# Patient Record
Sex: MALE | Race: WHITE | NOT HISPANIC OR LATINO | Employment: OTHER | URBAN - METROPOLITAN AREA
[De-identification: names, ages, dates, MRNs, and addresses within clinical notes are randomized per-mention and may not be internally consistent; named-entity substitution may affect disease eponyms.]

---

## 2017-09-27 ENCOUNTER — HOSPITAL ENCOUNTER (OUTPATIENT)
Dept: RADIOLOGY | Facility: MEDICAL CENTER | Age: 62
End: 2017-09-27
Attending: INTERNAL MEDICINE
Payer: MEDICARE

## 2017-09-27 ENCOUNTER — HOSPITAL ENCOUNTER (OUTPATIENT)
Dept: RADIOLOGY | Facility: MEDICAL CENTER | Age: 62
End: 2017-09-27

## 2017-09-27 DIAGNOSIS — C85.10 B-CELL LYMPHOMA, UNSPECIFIED B-CELL LYMPHOMA TYPE, UNSPECIFIED BODY REGION (HCC): ICD-10-CM

## 2017-09-27 DIAGNOSIS — M15.0 PRIMARY GENERALIZED (OSTEO)ARTHRITIS: ICD-10-CM

## 2017-09-27 DIAGNOSIS — M15.0 PRIMARY GENERALIZED HYPERTROPHIC OSTEOARTHROSIS: ICD-10-CM

## 2017-09-27 PROCEDURE — 73080 X-RAY EXAM OF ELBOW: CPT | Mod: RT

## 2017-09-27 PROCEDURE — 700117 HCHG RX CONTRAST REV CODE 255: Performed by: INTERNAL MEDICINE

## 2017-09-27 PROCEDURE — A9579 GAD-BASE MR CONTRAST NOS,1ML: HCPCS | Performed by: INTERNAL MEDICINE

## 2017-09-27 PROCEDURE — 74183 MRI ABD W/O CNTR FLWD CNTR: CPT

## 2017-09-27 PROCEDURE — 73030 X-RAY EXAM OF SHOULDER: CPT | Mod: RT

## 2017-09-27 RX ADMIN — GADODIAMIDE 20 ML: 287 INJECTION INTRAVENOUS at 16:25

## 2018-05-28 ENCOUNTER — PATIENT OUTREACH (OUTPATIENT)
Dept: HEALTH INFORMATION MANAGEMENT | Facility: OTHER | Age: 63
End: 2018-05-28

## 2018-05-28 NOTE — PROGRESS NOTES
Attempt #:Final  HealthConnect Verified: yes  Verify PCP: yes/ Dr. Cheo Interiano // Non [facility] PCP    Pt will discuss care gaps with PCP.     Care Gap Scheduling (Attempt to Schedule EACH Overdue Care Gap!)  Health Maintenance Due   Topic Date Due   • Annual Wellness Visit  1955   • IMM DTaP/Tdap/Td Vaccine (1 - Tdap) 08/14/1973   • IMM PNEUMOCOCCAL 19-64 (ADULT) HIGHEST RISK SERIES (1 of 3 - PCV13) 08/14/1973   • COLONOSCOPY  08/14/2004

## 2018-10-16 ENCOUNTER — PATIENT OUTREACH (OUTPATIENT)
Dept: HEALTH INFORMATION MANAGEMENT | Facility: OTHER | Age: 63
End: 2018-10-16

## 2018-10-16 NOTE — PROGRESS NOTES
1. Attempt #:1    2. HealthConnect Verified: yes    3. Verify PCP: yes    4. Review Care Team: no    5. WebIZ Checked & Epic Updated: Yes  · WebIZ Recommendations: TD and SHINGRIX (Shingles)  · Is patient due for Tdap? YES. Patient was not notified of copay/out of pocket cost.  · Is patient due for Shingles? YES. Patient was not notified of copay/out of pocket cost.    6. Reviewed/Updated the following with patient:       •   Communication Preference Obtained? YES       •   Preferred Pharmacy? NO       •   Preferred Lab? NO       •   Family History (document living status of immediate family members and if + hx of cancer, diabetes, hypertension, hyperlipidemia, heart attack, stroke) NO    7. Annual Wellness Visit Scheduling  · Scheduling Status:Not Scheduled. Patient states they are not interested       9. Care Gap Scheduling (Attempt to Schedule EACH Overdue Care Gap!)     Health Maintenance Due   Topic Date Due   • Annual Wellness Visit  1955   • IMM DTaP/Tdap/Td Vaccine (1 - Tdap) 08/14/1973   • COLONOSCOPY  08/14/2004   • IMM ZOSTER VACCINES (1 of 2) 08/14/2004   • IMM INFLUENZA (1) 09/01/2018        Patient declined Immunizations: TDAP and SHINGRIX (Shingles).     10. Midokura Activation: declined    11. Midokura Kar: no    12. Virtual Visits: no    13. Opt In to Text Messages: no    14. Patient was NOT advised: “This is a free wellness visit. The provider will screen for medical conditions to help you stay healthy. If you have other concerns to address you may be asked to discuss these at a separate visit or there may be an additional fee.”     15. Patient was NOT informed to arrive 15 min prior to their scheduled appointment and bring in their medication bottles.

## 2018-12-06 ENCOUNTER — PATIENT OUTREACH (OUTPATIENT)
Dept: HEALTH INFORMATION MANAGEMENT | Facility: OTHER | Age: 63
End: 2018-12-06

## 2018-12-06 NOTE — PROGRESS NOTES
Outcome: Left Message    Please transfer to Patient Outreach Team at [phone] when patient returns call.    WebIZ Checked & Epic Updated:  yes    HealthConnect Verified: yes    Attempt # 1

## 2018-12-20 NOTE — PROGRESS NOTES
Outcome: Left Message    Please transfer to Patient Outreach Team at [phone] when patient returns call.    Attempt # 2    QIP

## 2019-05-22 ENCOUNTER — APPOINTMENT (OUTPATIENT)
Dept: RADIOLOGY | Facility: MEDICAL CENTER | Age: 64
End: 2019-05-22
Attending: NURSE PRACTITIONER
Payer: MEDICARE

## 2019-05-22 DIAGNOSIS — R22.0 INTRACRANIAL SWELLING: ICD-10-CM

## 2019-05-22 DIAGNOSIS — M54.2 CERVICALGIA: ICD-10-CM

## 2019-05-22 PROCEDURE — 72141 MRI NECK SPINE W/O DYE: CPT

## 2019-05-22 PROCEDURE — A9585 GADOBUTROL INJECTION: HCPCS | Performed by: NURSE PRACTITIONER

## 2019-05-22 PROCEDURE — 70553 MRI BRAIN STEM W/O & W/DYE: CPT

## 2019-05-22 PROCEDURE — 700117 HCHG RX CONTRAST REV CODE 255: Performed by: NURSE PRACTITIONER

## 2019-05-22 RX ORDER — GADOBUTROL 604.72 MG/ML
10 INJECTION INTRAVENOUS ONCE
Status: COMPLETED | OUTPATIENT
Start: 2019-05-22 | End: 2019-05-22

## 2019-05-22 RX ADMIN — GADOBUTROL 10 ML: 604.72 INJECTION INTRAVENOUS at 13:50

## 2021-03-03 DIAGNOSIS — Z23 NEED FOR VACCINATION: ICD-10-CM

## 2021-06-22 ENCOUNTER — PATIENT OUTREACH (OUTPATIENT)
Dept: HEALTH INFORMATION MANAGEMENT | Facility: OTHER | Age: 66
End: 2021-06-22

## 2022-06-30 ENCOUNTER — TELEPHONE (OUTPATIENT)
Dept: HEALTH INFORMATION MANAGEMENT | Facility: OTHER | Age: 67
End: 2022-06-30
Payer: MEDICARE

## 2022-07-06 ENCOUNTER — PATIENT MESSAGE (OUTPATIENT)
Dept: HEALTH INFORMATION MANAGEMENT | Facility: OTHER | Age: 67
End: 2022-07-06

## 2022-08-01 ENCOUNTER — TELEPHONE (OUTPATIENT)
Dept: HEALTH INFORMATION MANAGEMENT | Facility: OTHER | Age: 67
End: 2022-08-01

## 2022-10-17 ENCOUNTER — DOCUMENTATION (OUTPATIENT)
Dept: HEALTH INFORMATION MANAGEMENT | Facility: OTHER | Age: 67
End: 2022-10-17
Payer: MEDICARE

## 2022-12-13 ENCOUNTER — OFFICE VISIT (OUTPATIENT)
Dept: URGENT CARE | Facility: CLINIC | Age: 67
End: 2022-12-13
Payer: MEDICARE

## 2022-12-13 VITALS
HEIGHT: 73 IN | SYSTOLIC BLOOD PRESSURE: 120 MMHG | WEIGHT: 220 LBS | BODY MASS INDEX: 29.16 KG/M2 | RESPIRATION RATE: 16 BRPM | TEMPERATURE: 97.9 F | DIASTOLIC BLOOD PRESSURE: 78 MMHG | HEART RATE: 80 BPM | OXYGEN SATURATION: 95 %

## 2022-12-13 DIAGNOSIS — H61.22 IMPACTED CERUMEN OF LEFT EAR: ICD-10-CM

## 2022-12-13 DIAGNOSIS — Z76.0 MEDICATION REFILL: ICD-10-CM

## 2022-12-13 PROBLEM — R53.83 FATIGUE: Status: ACTIVE | Noted: 2022-12-13

## 2022-12-13 PROBLEM — R10.9 ABDOMINAL PAIN: Status: ACTIVE | Noted: 2022-12-13

## 2022-12-13 PROBLEM — F32.A DEPRESSION: Status: ACTIVE | Noted: 2022-12-13

## 2022-12-13 PROBLEM — F41.9 ANXIETY: Status: ACTIVE | Noted: 2022-12-13

## 2022-12-13 PROBLEM — M32.9 LUPUS (HCC): Status: ACTIVE | Noted: 2022-12-13

## 2022-12-13 PROBLEM — C80.1 MALIGNANT NEOPLASM (HCC): Status: ACTIVE | Noted: 2022-12-13

## 2022-12-13 PROBLEM — Z86.718 HX OF BLOOD CLOTS: Status: ACTIVE | Noted: 2022-12-13

## 2022-12-13 PROBLEM — F52.0 LACK OR LOSS OF SEXUAL DESIRE: Status: ACTIVE | Noted: 2022-12-13

## 2022-12-13 PROBLEM — R79.82 ELEVATED HIGH SENSITIVITY C-REACTIVE PROTEIN: Status: ACTIVE | Noted: 2017-02-21

## 2022-12-13 PROBLEM — E78.00 HIGH CHOLESTEROL: Status: ACTIVE | Noted: 2022-12-13

## 2022-12-13 PROBLEM — E55.9 VITAMIN D DEFICIENCY: Status: ACTIVE | Noted: 2017-02-21

## 2022-12-13 PROCEDURE — 99203 OFFICE O/P NEW LOW 30 MIN: CPT | Performed by: NURSE PRACTITIONER

## 2022-12-13 RX ORDER — OMEPRAZOLE 40 MG/1
40 CAPSULE, DELAYED RELEASE ORAL DAILY
Qty: 30 CAPSULE | Refills: 0 | Status: SHIPPED | OUTPATIENT
Start: 2022-12-13 | End: 2023-06-15

## 2022-12-13 NOTE — PROGRESS NOTES
Chief Complaint   Patient presents with    Otalgia     L ear pain x 2 days       HISTORY OF PRESENT ILLNESS: Patient is a pleasant 67 y.o. male who presents to urgent care today with complaints of left ear pain.  Patient notes that he has had pain for the last 3 to 4 days.  He otherwise feels well and denies any fever, chills, malaise.  He has not tried medication for symptom relief.  He does use Q-tips.  Furthermore, he is requesting a refill on Prilosec.  He is prescribed 40 mg daily by his gastroenterologist but has run out of the medication.    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     No current [facility]-ordered facility-administered medications on file.       Past Medical History:   Diagnosis Date    Cancer (HCC)     skin    DVT (deep venous thrombosis) (Former[facility]) 2014    lle    PE (pulmonary embolism)     Psychiatric disorder     anxiety        Social History     Tobacco Use    Smoking status: Never   Vaping Use    Vaping Use: Never used   Substance Use Topics    Alcohol use: Yes     Comment: occ    Drug use: No       No family status information on file.   History reviewed. No pertinent family history.    ROS:  Review of Systems   Constitutional: Negative for fever, chills, weight loss, malaise, and fatigue.   HENT:  "Positive for ear pain.   Negative for nosebleeds, congestion, sore throat and neck pain.    Eyes: Negative for vision changes.   Neuro: Negative for headache, sensory changes, weakness, seizure, LOC.   Cardiovascular: Negative for chest pain, palpitations, orthopnea and leg swelling.   Respiratory: Negative for cough, sputum production, shortness of breath and wheezing.   Gastrointestinal: Negative for abdominal pain, nausea, vomiting or diarrhea.   Genitourinary: Negative for dysuria, urgency and frequency.  Musculoskeletal: Negative for falls, neck pain, back pain, joint pain, myalgias.   Skin: Negative for rash, diaphoresis.     Exam:  /78   Pulse 80   Temp 36.6 °C (97.9 °F) (Temporal)   Resp 16   Ht 1.854 m (6' 1\")   Wt 99.8 kg (220 lb)   SpO2 95%   General: well-nourished, well-developed male in NAD  Head: normocephalic, atraumatic  Eyes: PERRLA, no conjunctival injection, acuity grossly intact, lids normal.  Ears: normal shape and symmetry, no tenderness, no discharge. External canals are without any significant edema or erythema.  Right tympanic membrane is without any inflammation, no effusion.  Unable to visualize left tympanic membrane due to cerumen impaction.  Gross auditory acuity is intact.  Nose: symmetrical without tenderness, no discharge.  Mouth/Throat: reasonable hygiene, no erythema, exudates or tonsillar enlargement.  Neck: no masses, range of motion within normal limits, no tracheal deviation. No obvious thyroid enlargement.   Lymph: no cervical adenopathy. No supraclavicular adenopathy.   Neuro: alert and oriented. Cranial nerves 1-12 grossly intact. No sensory deficit.   Cardiovascular: regular rate and rhythm. No edema.  Pulmonary: no distress. Chest is symmetrical with respiration, no wheezes, crackles, or rhonchi.   Musculoskeletal: no clubbing, appropriate muscle tone, gait is stable.  Skin: warm, dry, intact, no clubbing, no cyanosis, no rashes.   Psych: appropriate mood, " affect, judgement.       Procedure: Cerumen Removal  Risks and benefits of procedure discussed  Cerumen removed with curette and lavage after softening agent instilled, by MA  Patient tolerated well  Post procedure exam with clear canal and normal TM        Assessment/Plan:  1. Impacted cerumen of left ear        2. Medication refill  omeprazole (PRILOSEC) 40 MG delayed-release capsule            Patient presents with left-sided otalgia.  Upon examination he has left-sided cerumen impaction.  Symptoms improved after removal of cerumen.  Instructed to keep ear clean and dry.  Regarding his omeprazole, he is given a 30-day supply refill, instructed follow-up with his gastroenterologist for for future medication management.    Supportive care, differential diagnoses, and indications for immediate follow-up discussed with patient.   Pathogenesis of diagnosis discussed including typical length and natural progression.   Instructed to return to clinic or nearest emergency department for any change in condition, further concerns, or worsening of symptoms.  Patient states understanding of the plan of care and discharge instructions.  Instructed to make an appointment, for follow up, with his primary care provider.        Please note that this dictation was created using voice recognition software. I have made every reasonable attempt to correct obvious errors, but I expect that there are errors of grammar and possibly content that I did not discover before finalizing the note.      VON Alonso.

## 2023-05-29 ENCOUNTER — HOSPITAL ENCOUNTER (EMERGENCY)
Facility: MEDICAL CENTER | Age: 68
End: 2023-05-29
Attending: EMERGENCY MEDICINE
Payer: MEDICARE

## 2023-05-29 ENCOUNTER — APPOINTMENT (OUTPATIENT)
Dept: RADIOLOGY | Facility: MEDICAL CENTER | Age: 68
End: 2023-05-29
Attending: EMERGENCY MEDICINE
Payer: MEDICARE

## 2023-05-29 VITALS
RESPIRATION RATE: 16 BRPM | DIASTOLIC BLOOD PRESSURE: 59 MMHG | TEMPERATURE: 97.6 F | BODY MASS INDEX: 29.61 KG/M2 | HEART RATE: 67 BPM | OXYGEN SATURATION: 97 % | WEIGHT: 224.43 LBS | SYSTOLIC BLOOD PRESSURE: 129 MMHG

## 2023-05-29 DIAGNOSIS — I82.452 ACUTE DEEP VEIN THROMBOSIS (DVT) OF LEFT PERONEAL VEIN (HCC): ICD-10-CM

## 2023-05-29 LAB
APTT PPP: 25.7 SEC (ref 24.7–36)
INR PPP: 0.98 (ref 0.87–1.13)
LA PPP-IMP: NORMAL
LMWH PPP CHRO-ACNC: <0.1 U/ML
PROTHROMBIN TIME: 12.9 SEC (ref 12–14.6)
SCREEN DRVVT: 37.6 SEC (ref 28–48)

## 2023-05-29 PROCEDURE — 85303 CLOT INHIBIT PROT C ACTIVITY: CPT

## 2023-05-29 PROCEDURE — 85730 THROMBOPLASTIN TIME PARTIAL: CPT

## 2023-05-29 PROCEDURE — 86147 CARDIOLIPIN ANTIBODY EA IG: CPT | Mod: 91

## 2023-05-29 PROCEDURE — 93971 EXTREMITY STUDY: CPT | Mod: LT

## 2023-05-29 PROCEDURE — 85520 HEPARIN ASSAY: CPT

## 2023-05-29 PROCEDURE — 700102 HCHG RX REV CODE 250 W/ 637 OVERRIDE(OP): Performed by: EMERGENCY MEDICINE

## 2023-05-29 PROCEDURE — A9270 NON-COVERED ITEM OR SERVICE: HCPCS | Performed by: EMERGENCY MEDICINE

## 2023-05-29 PROCEDURE — 85306 CLOT INHIBIT PROT S FREE: CPT

## 2023-05-29 PROCEDURE — 99285 EMERGENCY DEPT VISIT HI MDM: CPT

## 2023-05-29 PROCEDURE — 85610 PROTHROMBIN TIME: CPT

## 2023-05-29 PROCEDURE — 36415 COLL VENOUS BLD VENIPUNCTURE: CPT

## 2023-05-29 PROCEDURE — 81241 F5 GENE: CPT

## 2023-05-29 PROCEDURE — 85613 RUSSELL VIPER VENOM DILUTED: CPT

## 2023-05-29 RX ORDER — OXYCODONE HYDROCHLORIDE AND ACETAMINOPHEN 5; 325 MG/1; MG/1
1 TABLET ORAL EVERY 4 HOURS PRN
Qty: 15 TABLET | Refills: 0 | Status: SHIPPED | OUTPATIENT
Start: 2023-05-29 | End: 2023-06-01

## 2023-05-29 RX ADMIN — RIVAROXABAN 15 MG: 15 TABLET, FILM COATED ORAL at 13:34

## 2023-05-29 NOTE — ED PROVIDER NOTES
ED Provider Note    Primary care provider: Rock Harry D.O.    CHIEF COMPLAINT  Chief Complaint   Patient presents with    Leg Pain     L lower leg pain, hx DVT       HPI  Maurice Comer is a 67 y.o. male who presents to the Emergency Department discomfort and swelling on the left medial lower leg over the past 24 hours.  The patient is concerned that he may have a DVT.  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  He states the first was likely secondary to some chronic fatigue syndrome he was essentially was recumbent for weeks on and.  The second was postoperatively in 2017.  He did approximately 6 months of Xarelto and has been off of it for several years.  He denies any recent trauma to the area, chest pain or shortness of breath.      External Record Review: No recent visits, patient was seen in the outpatient urgent care last year for cerumen impaction.  Does have documented history of pulmonary embolism and DVT, transferred in from [place] in 2017 for pulmonary embolism.      REVIEW OF SYSTEMS  See HPI.     PAST MEDICAL HISTORY   has a past medical history of Cancer ([facility]), DVT (deep venous thrombosis) ([facility]) (2014), PE (pulmonary embolism), and Psychiatric disorder.    SURGICAL HISTORY   has a past surgical history that includes other orthopedic surgery and tonsillectomy.    SOCIAL HISTORY  Social History     Tobacco Use    Smoking status: Never   Vaping Use    Vaping Use: Never used   Substance Use Topics    Alcohol use: Yes     Comment: occ    Drug use: No      Social History     Substance and Sexual Activity   Drug Use No       FAMILY HISTORY  No family history on file.    CURRENT MEDICATIONS  Reviewed.  See Encounter Summary.     ALLERGIES  Allergies   Allergen Reactions    Pcn [Penicillins] Unspecified     Per pt  As an infant  Was told  Per mother       PHYSICAL EXAM  VITAL SIGNS: BP (!) 144/73   Pulse 64   Temp 36.3 °C (97.4 °F) (Temporal)   Resp 16   Wt 102 kg (224 lb  6.9 oz)   SpO2 94%   BMI 29.61 kg/m²   Constitutional: Awake, alert in no apparent distress.  HENT: Normocephalic, Bilateral external ears normal. Nose normal.   Eyes: Conjunctiva normal, non-icteric, EOMI.    Thorax & Lungs: Easy unlabored respirations, Clear to ascultation bilaterally.  Cardiovascular: Regular rate, Regular rhythm, No murmurs, rubs or gallops. Bilateral pulses symmetrical.   Abdomen:  Soft, nontender, nondistended, normal active bowel sounds.   :    Skin: Visualized skin is  Dry, No erythema, No rash.   Musculoskeletal: There does appear to be some slight swelling, asymmetrical and on the medial aspect of the mid calf.  No warmth.  No ecchymosis.    Neurologic: Alert, Grossly non-focal.   Psychiatric: Normal affect, Normal mood  Lymphatic:      RADIOLOGY  Radiologist interpretation:   US-EXTREMITY VENOUS LOWER UNILAT LEFT   Final Result          COURSE & MEDICAL DECISION MAKING  Pertinent Labs & Imaging studies reviewed. (See chart for details)    COURSE & MEDICAL DECISION MAKING  Pertinent Labs & Imaging studies reviewed. (See chart for details)    Differential diagnoses include but are not limited to: DVT, lipoma, AVM, Baker's cyst    12:00 PM - Nursing notes reviewed, patient seen and examined at bedside.    Discussion of management with other medical personnel: Dr. Smith, radiologist    Escalation of care considered, and ultimately not performed: acute inpatient care management, however at this time, the patient is most appropriate for outpatient management.    Decision tools and prescription drugs considered including, but not limited to: Wells score moderate    Decision Making:  This is a pleasant 67 y.o. year old male who presents with swelling, discomfort over the left calf.  The patient does indeed have a DVT.  Unfortunately for him this is his third DVT, I would not consider this when provoked.  Likely he will require lifelong anticoagulation.  Oxygenation 98%, no chest pain, no  shortness of breath, hemodynamically stable, do not suspect a significant PE at this time and would not recommend CTA of the chest.    The patient was started on Xarelto as he had good results with this previously.  I have also sent labs off for the hypercoagulable work-up and place referral into the anticoagulation clinic.  I counseled the patient on return precautions with regards to DOAC use.         The patient was discharged home (see d/c instructions) was told to return immediately for any signs or symptoms listed, or any worsening at all.  The patient verbally agreed to the discharge precautions and follow-up plan which is documented in EPIC.    Discharge Medications:  New Prescriptions    RIVAROXABAN (XARELTO) 15 MG TAB TABLET    Take 1 Tablet by mouth 2 times a day for 21 days.       FINAL IMPRESSION  1. Acute deep vein thrombosis (DVT) of left peroneal vein (HCC)

## 2023-05-29 NOTE — ED NOTES
Discharge teaching and paperwork provided and all questions/concerns answered. VSS, assessment stable. Given information regarding Rx. Patient discharged to the care of self and ambulated out of the ED.     Pt aware to follow up with the coagulation clinic, referral sent.

## 2023-05-29 NOTE — ED NOTES
Pt ambulatory to room for the triaged complaint.     Reports he has had previous PEs and DVTs, was on Xarelto but doesn't take it anymore.

## 2023-05-30 NOTE — DISCHARGE PLANNING
ER  Note:  Received call from pt's spouse, Lorri, saying that pt is in a lot of pain on his leg from his DVT and is requesting for pain medication to be sent to [facility], [facility] or any open pharmacy in the area. She reported that [facility] is closed. RN CM called [facility], they close at [phone]. RN CM called [facility] on [address], they are open 24 hours. ERP informed. Dr. Masterson sent prescription. Per Epic: E-Prescribing Status: Receipt confirmed by pharmacy (5/29/2023  6:00 PM PDT). RN CM called back pt and spouse, provided update and contact information for [facility]. They will call pharmacy for  time. They verbalized understanding and denies other needs at this time.

## 2023-05-31 ENCOUNTER — TELEPHONE (OUTPATIENT)
Dept: VASCULAR LAB | Facility: MEDICAL CENTER | Age: 68
End: 2023-05-31
Payer: MEDICARE

## 2023-05-31 LAB
CARDIOLIPIN IGA SER IA-ACNC: <10 APL
CARDIOLIPIN IGG SER IA-ACNC: <10 GPL
CARDIOLIPIN IGM SER IA-ACNC: 12 MPL
PROT C ACT/NOR PPP: 154 % (ref 83–168)
PROT S ACT/NOR PPP: 140 % (ref 66–143)

## 2023-05-31 NOTE — TELEPHONE ENCOUNTER
[facility]    Received anticoagulation referral from Dr Masterson on 5/29    1st call  [facility] to establish care    Insurance: SCP  PCP: non [facility]  Locations to be seen: [facility] at [phone], fax [phone]    Zoey Brian, PharmD

## 2023-06-01 ENCOUNTER — DOCUMENTATION (OUTPATIENT)
Dept: VASCULAR LAB | Facility: MEDICAL CENTER | Age: 68
End: 2023-06-01

## 2023-06-01 ENCOUNTER — ANTICOAGULATION VISIT (OUTPATIENT)
Dept: VASCULAR LAB | Facility: MEDICAL CENTER | Age: 68
End: 2023-06-01
Attending: INTERNAL MEDICINE
Payer: MEDICARE

## 2023-06-01 DIAGNOSIS — Z00.00 ROUTINE ADULT HEALTH MAINTENANCE: ICD-10-CM

## 2023-06-01 DIAGNOSIS — Z12.5 ENCOUNTER FOR SCREENING FOR MALIGNANT NEOPLASM OF PROSTATE: ICD-10-CM

## 2023-06-01 DIAGNOSIS — I82.452 ACUTE DEEP VEIN THROMBOSIS (DVT) OF LEFT PERONEAL VEIN (HCC): ICD-10-CM

## 2023-06-01 DIAGNOSIS — Z86.711 HISTORY OF PULMONARY EMBOLISM: ICD-10-CM

## 2023-06-01 PROBLEM — I82.409 DEEP VEIN THROMBOSIS (HCC): Status: ACTIVE | Noted: 2023-06-01

## 2023-06-01 PROCEDURE — 99213 OFFICE O/P EST LOW 20 MIN: CPT | Performed by: NURSE PRACTITIONER

## 2023-06-01 NOTE — PROGRESS NOTES
Initial anticoagulation clinic note reviewed  Patient with what appears to be a recurrent below-knee, unprovoked dvt.   Continue with 3 months of therapy and then discuss extended therapy as indicated and described in APN note    Michael Bloch, MD  Anticoagulation Clinic    Cc: CANDICE Harry

## 2023-06-01 NOTE — PROGRESS NOTES
Addendum 6/9/23 - reviewed labs. H/h and platelets WNL. Cr 1.02, cr cl 99 ml/min. LFTs normal.    NEW DOAC   .  Anticoagulation Patient Findings      PCP: none  Cardiologist: none  Dx: new LLE below the knee DVT  CHADSVASC = n/a  HAS-BLED = 1 (age)  Target End Date = 3 months then re-eval (8/29/23)    Pt Hx: Pt presented to the ER on 5/29/23 with LLE pain and swelling. U/s showed an acute to subacute, occlusive thrombus in the posterior tibial vein and peroneal vein. States he has been more sedentary lately. Denies any recent surgeries, traumas, hospitalizations or extended travel. He doesn't take hormones or use tobacco. Hx of skin cancer w/ multiple Moh's procedures. Follows with derm regularly. No current cancer. States he is up to date on his colonoscopy. Unsure when his last PSA was.    He has a hx of a PE in 2014. He reports being very sedentary for several months due to stress and having chronic fatigue syndrome. Believes the clot originated in his leg because he remembers having calf pain and his wife massaging his leg, then suddenly having SOB. He doesn't think a DVT was found, just the PE. Was on warfarin for ~4 months then stopped. He had a rt lobe seg PE w/ infarct in 2016 about 3 weeks after shoulder surgery for which he took Xarelto for 6 months.     LA, protein c/s, acla neg. FVL pending. He thinks his late daughter had a hx of a blood clot.    Still has some left calf tenderness. No SOB or CP. No problems with bleeding. Taking Xarelto 15 mg BID as instructed.      Labs:  Lab Results   Component Value Date/Time    WBC 9.5 07/28/2016 03:02 AM    RBC 4.27 (L) 07/28/2016 03:02 AM    HEMOGLOBIN 13.3 (L) 07/28/2016 03:02 AM    HEMATOCRIT 40.3 (L) 07/28/2016 03:02 AM    MCV 94.4 07/28/2016 03:02 AM    MCH 31.1 07/28/2016 03:02 AM    MCHC 33.0 (L) 07/28/2016 03:02 AM    MPV 9.2 07/28/2016 03:02 AM      Lab Results   Component Value Date/Time    SODIUM 137 07/28/2016 03:02 AM    POTASSIUM 4.1 07/28/2016 03:02  AM    CHLORIDE 105 07/28/2016 03:02 AM    CO2 25 07/28/2016 03:02 AM    GLUCOSE 111 (H) 07/28/2016 03:02 AM    BUN 14 07/28/2016 03:02 AM    CREATININE 1.02 07/28/2016 03:02 AM          Pt is new to Xarelto and new to RCC. Discussed:   Indication for DOAC therapy.  Importance of monitoring and compliance.   Monitoring parameters, signs and symptoms of bleeding or clotting.  DOAC therapy, side effects, potential DDIs, OTC medications  Hormonal therapy - to avoid  Pregnancy - n/a  DDI none  Pt is not on antiplatelet/NSAID therapy   Lifestyle safety, ie smoking, ETOH, hobby safety, fall safety/prevention  Procedures for missed doses or suspected missed doses, surgeries/procedures, travel, dental work, any medication changes    Continue Xarelto 15mg taken 2 times a day for 21 days and then change to 20mg taken once daily. Pt will transition to 20mg dose on 6/19/23.    Pt not sure if he wants long term therapy. Interested in full hypercoag panel which I can order after 3 months of therapy. Will plan to schedule LOT with pt at the end of 3 months then discuss risks vs benefits of continuing therapy. Offered to place referral for PCP. He prefers to hold off because he might be relocating to another state this summer/fall.    DOAC affordable = yes    Samples provided: no    Labs to be completed prior to next f/u - CBC, CMP, PSA    F/U - 2 weeks    RICK BairesPCLAIRE.      Added [facility] to care team   Send to Bloch

## 2023-06-03 LAB — F5 P.R506Q BLD/T QL: ABNORMAL

## 2023-06-08 ENCOUNTER — HOSPITAL ENCOUNTER (OUTPATIENT)
Dept: LAB | Facility: MEDICAL CENTER | Age: 68
End: 2023-06-08
Attending: NURSE PRACTITIONER
Payer: MEDICARE

## 2023-06-08 DIAGNOSIS — Z86.711 HISTORY OF PULMONARY EMBOLISM: ICD-10-CM

## 2023-06-08 DIAGNOSIS — Z12.5 ENCOUNTER FOR SCREENING FOR MALIGNANT NEOPLASM OF PROSTATE: ICD-10-CM

## 2023-06-08 DIAGNOSIS — I82.452 ACUTE DEEP VEIN THROMBOSIS (DVT) OF LEFT PERONEAL VEIN (HCC): ICD-10-CM

## 2023-06-08 DIAGNOSIS — Z00.00 ROUTINE ADULT HEALTH MAINTENANCE: ICD-10-CM

## 2023-06-08 LAB
ALBUMIN SERPL BCP-MCNC: 4.3 G/DL (ref 3.2–4.9)
ALBUMIN/GLOB SERPL: 1.5 G/DL
ALP SERPL-CCNC: 58 U/L (ref 30–99)
ALT SERPL-CCNC: 16 U/L (ref 2–50)
ANION GAP SERPL CALC-SCNC: 14 MMOL/L (ref 7–16)
AST SERPL-CCNC: 16 U/L (ref 12–45)
BILIRUB SERPL-MCNC: 0.6 MG/DL (ref 0.1–1.5)
BUN SERPL-MCNC: 14 MG/DL (ref 8–22)
CALCIUM ALBUM COR SERPL-MCNC: 8.8 MG/DL (ref 8.5–10.5)
CALCIUM SERPL-MCNC: 9 MG/DL (ref 8.5–10.5)
CHLORIDE SERPL-SCNC: 107 MMOL/L (ref 96–112)
CO2 SERPL-SCNC: 23 MMOL/L (ref 20–33)
CREAT SERPL-MCNC: 1.02 MG/DL (ref 0.5–1.4)
ERYTHROCYTE [DISTWIDTH] IN BLOOD BY AUTOMATED COUNT: 43.8 FL (ref 35.9–50)
GFR SERPLBLD CREATININE-BSD FMLA CKD-EPI: 80 ML/MIN/1.73 M 2
GLOBULIN SER CALC-MCNC: 2.9 G/DL (ref 1.9–3.5)
GLUCOSE SERPL-MCNC: 95 MG/DL (ref 65–99)
HCT VFR BLD AUTO: 44.3 % (ref 42–52)
HGB BLD-MCNC: 14.8 G/DL (ref 14–18)
MCH RBC QN AUTO: 32.5 PG (ref 27–33)
MCHC RBC AUTO-ENTMCNC: 33.4 G/DL (ref 32.3–36.5)
MCV RBC AUTO: 97.1 FL (ref 81.4–97.8)
PLATELET # BLD AUTO: 275 K/UL (ref 164–446)
PMV BLD AUTO: 9.4 FL (ref 9–12.9)
POTASSIUM SERPL-SCNC: 3.9 MMOL/L (ref 3.6–5.5)
PROT SERPL-MCNC: 7.2 G/DL (ref 6–8.2)
PSA SERPL-MCNC: 1.05 NG/ML (ref 0–4)
RBC # BLD AUTO: 4.56 M/UL (ref 4.7–6.1)
SODIUM SERPL-SCNC: 144 MMOL/L (ref 135–145)
WBC # BLD AUTO: 6.3 K/UL (ref 4.8–10.8)

## 2023-06-08 PROCEDURE — 84153 ASSAY OF PSA TOTAL: CPT

## 2023-06-08 PROCEDURE — 85027 COMPLETE CBC AUTOMATED: CPT

## 2023-06-08 PROCEDURE — 36415 COLL VENOUS BLD VENIPUNCTURE: CPT

## 2023-06-08 PROCEDURE — 80053 COMPREHEN METABOLIC PANEL: CPT

## 2023-06-15 ENCOUNTER — ANTICOAGULATION VISIT (OUTPATIENT)
Dept: VASCULAR LAB | Facility: MEDICAL CENTER | Age: 68
End: 2023-06-15
Attending: INTERNAL MEDICINE
Payer: MEDICARE

## 2023-06-15 DIAGNOSIS — I82.452 ACUTE DEEP VEIN THROMBOSIS (DVT) OF LEFT PERONEAL VEIN (HCC): ICD-10-CM

## 2023-06-15 DIAGNOSIS — Z86.711 HISTORY OF PULMONARY EMBOLISM: ICD-10-CM

## 2023-06-15 PROCEDURE — 99212 OFFICE O/P EST SF 10 MIN: CPT | Performed by: NURSE PRACTITIONER

## 2023-06-15 NOTE — PROGRESS NOTES
Diagnosis: recurrent VTE, hetero FVL  Drug: Xarelto 15 mg BID x 21 days then 20 mg daily  LOT: Indefinite  CHADSVASC: n/a  HAS-BLED: 1 (age)    Health Status Since Last Assessment  Any new relevant medical problems, ED visits/hospitalizations - no  Any embolic events (stroke / TIA / systemic embolism) - no    Doing well on Xarelto. Taking BID with food as instrcuted. No problems with bleeding. Still feels occasional left calf discomfort but overall doing better. Is walking and avoiding prolonged sedentary periods.    He is heterozygous FVL positive. He has one daughter who is . She was found to have FVL, MTHFR gene mutation and hemochromatosis S65c. Pt interested in testing for these and would like to discuss with heme which I will place a referral for.     Reviewed cbc, cmp. Psa normal. LA, protein c/s def and acla tested in the ER and negative.    Adherence with DOAC Therapy  1 missed dose(s)    BLEEDING RISK ASSESSMENT:    Bleeding Risk Assessment  Severe epistaxis - no   Hemoptysis - no  Excessive or unusual bruising / hematomas - no  GIB/melena/BRBPR/hematemesis - no  Hematuria - no   Abnormal vaginal bleeding - n/a  Concerning daily headache or subdural hematoma symptoms - no  Decreasing hemoglobin or new anemia - no  Falls, presyncope, syncope, or seizures - no  Platelets: 275  Latest hemoglobin:     Lab Results   Component Value Date/Time    WBC 6.3 2023 09:52 AM    RBC 4.56 (L) 2023 09:52 AM    HEMOGLOBIN 14.8 2023 09:52 AM    HEMATOCRIT 44.3 2023 09:52 AM    MCV 97.1 2023 09:52 AM    MCH 32.5 2023 09:52 AM    MCHC 33.4 2023 09:52 AM    MPV 9.4 2023 09:52 AM        HAS-BLED:  Hypertension (uncontrolled, >160 mmHg systolic) - no  Renal disease (dialysis, transplant, Cr >2.26 mg/dL or >200 µmol/L) - no  Liver disease (cirrhosis or bilirubin >2x normal with AST/ALT/AP >3x normal) - no  Stroke history - no  Prior major bleeding or predisposition to  bleeding - no  Labile INR Unstable/high INRs, time in therapeutic range <60% - no  Age >65 - yes  Medication usage predisposing to bleeding (aspirin, clopidogrel, NSAIDs) - no  Alcohol use  ?8 drinks/week - no      Creatinine Clearance/Renal Function  Latest eGFR / creatinine:  Lab Results   Component Value Date/Time    SODIUM 144 06/08/2023 09:52 AM    POTASSIUM 3.9 06/08/2023 09:52 AM    CHLORIDE 107 06/08/2023 09:52 AM    CO2 23 06/08/2023 09:52 AM    GLUCOSE 95 06/08/2023 09:52 AM    BUN 14 06/08/2023 09:52 AM    CREATININE 1.02 06/08/2023 09:52 AM       Is eGFR less than 50ml/min  no  Cr cl 99 ml/min    If YES, calculate CrCl (see back)  Any recent dehydrating illness or medications added/changed? i.e. Diuretics      Drug Interactions  ASA/antiplatelets - avoids  NSAID - avoids  Other drug interactions - none (Review med list / OTCs;)  No current outpatient medications on file prior to visit.     No current facility-administered medications on file prior to visit.     Verified no anticonvulsant or azole therapy, education provided for future use.    Significant gait impairment / imbalance / high risk for falls - no obvious risks    Final Assessment and Recommendations:  Patient appears stable from the anticoagulation standpoint  Benefits of continued DOAC therapy outweigh risks for this patient  Recommend continue current DOAC at same dose until he takes his last tablets then will switch to Xarelto 20 mg by mouth daily with food.    - Continue taking Xarelto 15 mg by mouth twice daily with food until you run out then began taking Xarelto 20 mg by mouth ONCE daily with food    Other Actions:   The rationale for continued DOAC therapy  The potential for minor, major or life-threatening bleeding  Dosing instructions, adherence, risks of non-adherence, handling missed doses  Avoiding OTC ASA & NSAIDs & minimizing EtOH to reduce bleeding risks  Dosing around upcoming procedure / surgery if applicable (see  back)    Follow up:  Will follow up with patient in 3 months      Elli TORRES

## 2023-06-16 ENCOUNTER — PHARMACY VISIT (OUTPATIENT)
Dept: PHARMACY | Facility: MEDICAL CENTER | Age: 68
End: 2023-06-16
Payer: MEDICARE

## 2023-06-16 PROCEDURE — RXMED WILLOW AMBULATORY MEDICATION CHARGE: Performed by: NURSE PRACTITIONER

## 2023-08-20 NOTE — PROGRESS NOTES
VASCULAR MEDICINE CLINIC - INITIAL VISIT (ANTICOAGULATION)  08/24/23     Maurice Comer is a 68 y.o. male who presents today for LOT.     Subjective    HPI:  Patient referred for evaluation and management of anticoagulation therapy.  He presented to the ER on 5/29/23 with LLE pain and swelling.  U/s showed a LLE posterior tibial and peroneal vein DVT.  States he has been more sedentary lately.   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  He was neg for lupus anticoagulant, protein c and s def and acla.  Denies any recent surgeries, traumas, hospitalizations or extended travel. He doesn't take hormones or use tobacco.   He has a hx of a PE in 2014.   He reports being very sedentary for several months due to stress and having chronic fatigue syndrome.   Believes the clot originated in his leg because he remembers having calf pain and his wife massaging his leg, then suddenly having SOB.   He doesn't think a DVT was found, just the PE.   Was on warfarin for ~4 months then stopped.   He then had a rt lobe seg PE w/ infarct in 2016 about 3 weeks after shoulder surgery for which he took Xarelto for 6 months.   Hx of skin cancer w/ multiple Moh's procedures.   Follows with derm regularly.   No current cancer.   States he is up to date on his colonoscopy.   Last PSA in June 2023.  Thinks his late daughter had a hx of a blood clot.  Started on Xarelto 15 mg BID x 21 day then 20 mg daily.  Adherent to taking.   No problems with bleeding.  Copay affordable.  Avoids NSAIDs for the most part but takes very occasionally.  No current SOB or CP.  No LLE swelling or pain.  BMI 29.6  Has resumed normal activities.  [place] several months.  Has completed 3 mo of therapy.    Past Medical History:   Diagnosis Date    Cancer (HCC)     skin    DVT (deep venous thrombosis) (HCC) 2014    lle    PE (pulmonary embolism)     Psychiatric disorder     anxiety      "    Past Surgical History:   Procedure Laterality Date    OTHER ORTHOPEDIC SURGERY      laprascopic shoulder    TONSILLECTOMY          No family history on file.     Social History     Tobacco Use    Smoking status: Never   Vaping Use    Vaping Use: Never used   Substance Use Topics    Alcohol use: Yes     Comment: occ    Drug use: No        No current outpatient medications on file prior to visit.     No current facility-administered medications on file prior to visit.      Allergies:  Pcn [penicillins]     DIET AND EXERCISE:  Weight Change: stable  Diet: common adult  Exercise: sporadic irregular exercise     Review of Systems   HENT:  Negative for nosebleeds.    Respiratory:  Negative for shortness of breath.    Cardiovascular:  Negative for chest pain, claudication and leg swelling.   Gastrointestinal:  Negative for blood in stool and melena.   Genitourinary:  Negative for hematuria.   Musculoskeletal:  Negative for falls.   Neurological:  Negative for seizures and loss of consciousness.   Endo/Heme/Allergies:  Bruises/bleeds easily.            Objective       Objective:     Vitals:    08/24/23 1631   BP: 110/64   Pulse: (!) 58        Physical Exam     DATA REVIEW    No results found for: \"CHOLSTRLTOT\", \"LDL\", \"HDL\", \"TRIGLYCERIDE\"    Lab Results   Component Value Date/Time    SODIUM 144 06/08/2023 09:52 AM    POTASSIUM 3.9 06/08/2023 09:52 AM    CHLORIDE 107 06/08/2023 09:52 AM    CO2 23 06/08/2023 09:52 AM    GLUCOSE 95 06/08/2023 09:52 AM    BUN 14 06/08/2023 09:52 AM    CREATININE 1.02 06/08/2023 09:52 AM     Lab Results   Component Value Date/Time    ALKPHOSPHAT 58 06/08/2023 09:52 AM    ASTSGOT 16 06/08/2023 09:52 AM    ALTSGPT 16 06/08/2023 09:52 AM    TBILIRUBIN 0.6 06/08/2023 09:52 AM       INR   Date Value Ref Range Status   05/29/2023 0.98 0.87 - 1.13 Final     Comment:     INR - Non-therapeutic Reference Range: 0.87-1.13  INR - Therapeutic Reference Range: 2.0-4.0       No results found for: \"POCINR\" "     7/28/16 Juanito LE venous duplex:   1.  Normal bilateral superficial and deep venous examination of the lower    extremities.     5/29/23 LLE venous duplex:   Acute thrombosis of the PTV and peroneal vein.     Medical Decision Making:  Today's Assessment / Status / Plan:     1. Acute deep vein thrombosis (DVT) of left peroneal vein (HCC)  rivaroxaban (XARELTO) 20 MG Tab tablet      2. History of pulmonary embolism  rivaroxaban (XARELTO) 20 MG Tab tablet      3. Factor V Leiden (HCC)        4. Hx of blood clots             Indication for anticoagulation: recurrent VTEs, most recently unprovoked below the knee DVT    Anti-Platelet/Anticoagulant Discussion:  Long discussion regarding the risks and benefits of anticoagulation therapy in this setting. He has a hx of recurrent VTEs. Has known hetero FVL. Has a hx of chronic fatigue syndrome so ongoing risk includes sedentary lifestyle at times, extensive travel and male sex. Fortunately, he has tolerated Xarelto w/o any major bleeding problems. His risk of major bleeding is 3.4% annually (HAS BLED score = 1 for age) which is considered low risk. Discussed the option of extended dose Xarelto 10 mg after 6 months of therapy. He understands my recommendation is for indefinite therapy which pt is agreeable to. With shared decision-making he will continue 20 mg daily. Stressed the importance of close surveillance for s/sx of recurrent VTEs and to monitor for any prolonged/abnormal bleeding. Cautioned to avoid NSAIDs and to be very careful with etoh consumption due to increased risk of bleeding. We discussed when to seek immediate medical attention. Asked that he let all his providers know he has a hx of blood clots and that he takes Xarelto, anel if having any surgeries or hospitalizations. Continue to avoid hormones, tobacco and prolonged periods of immobility. Recommend getting up and walking every 1-2 hours, doing foot pumps and wearing compression stockings during long  travel. Recommended he keep up with cancer screenings as a small % of VTE can be caused by an underlying malignancy. Asked pt to obtain a medical alert bracelet due to taking an anticoagulant. Pt verbalizes understanding and is in agreement with this plan.    Anti-Coagulation Plan:  - continue taking Xarelto 20 mg by mouth daily with food  - go to the ER for shortness of breath, chest pain, pain with deep inhalation, worsening leg swelling and/or pain in calf or leg   - avoid sedentary periods  - let all your providers know you take this medication  - don't stop this medication without permission from your doctor or our clinic  -  refills before you run out  - continue complete avoidance of tobacco products  - avoid hormonal therapies including estrogen or testosterone-containing meds, or raloxifene or tamoxifene (commonly used for osteoporosis)  - avoid Aspirin and anti-inflammatories (eg. Advil, ibuprofen, Aleve, naproxen, etc) while anticoagulated   - avoid skiing or other dangerous activities to reduce risk of head injury and brain bleeds  - elevate legs as much as possible, use compression stockings/socks if sitting or standing for prolonged periods  - if any bleeding lasting 30min without stopping, please seek care with your PCP, urgent care, or ED  - if having any invasive procedure, please make sure the doctor knows of your history of blood clots and current anticoagulation status  - keep up with age-appropriate cancer screenings as a small % of blood clots may be caused by an underlying malignancy     Smoking: continue complete avoidance of all tobacco products     Physical Activity: goal is 30 min of mod activity 5 times per week     Weight Management and Nutrition: High protein, high vegetable diet like Mediterranean diet     Instructed to follow-up with PCP for remainder of adult medical needs: yes  We will partner with other provider in the management of established vascular disease and  cardiometabolic risk factors    Studies to Be Obtained: none  Labs to Be Obtained: cbc, cmp every 6 mo while taking doac    Follow up in: 1 year per patient request due to traveling internationally. He understands the recommendation per our protocol is f/u every 6 months.    Elli TORRES

## 2023-08-22 ENCOUNTER — TELEPHONE (OUTPATIENT)
Dept: PHARMACY | Facility: MEDICAL CENTER | Age: 68
End: 2023-08-22
Payer: MEDICARE

## 2023-08-22 PROCEDURE — RXMED WILLOW AMBULATORY MEDICATION CHARGE: Performed by: NURSE PRACTITIONER

## 2023-08-22 NOTE — TELEPHONE ENCOUNTER
Contact:  [id number]       Maurice Comer    Phone number: [phone]    Name of person spoken with and relationship to patient: Maurice-self   Patient’s Adherence:            How patient is doing on medication:  well    How many missed doses and reason: 2    Any new medications: no    Any new conditions: no    Any new allergies: no    Any new side effects: no     Any new diagnoses: no     How many doses remaining:  approx 7 or more    Did patient want to speak with pharmacist: no  Delivery:            Delivery date and method:  08/23     Needs by Date:  08/23    Signature required:  no    Any additional details for :  na  Teach Appointment Date:  na  Shipping Address:  [address] [place] [id number]  Medication(name, strength and dose):  Xarelto 20mg 1qd  Copay:  $117.50   Payment Method: ccof  Supplies:  na  Additional info:Dutch was walking his dog so was not sure exactly how many on hand but at least a week but was okay getting delivered 8/23. stated has had no more blood clots so feels med must be working well.

## 2023-08-23 ENCOUNTER — PHARMACY VISIT (OUTPATIENT)
Dept: PHARMACY | Facility: MEDICAL CENTER | Age: 68
End: 2023-08-23
Payer: MEDICARE

## 2023-08-24 ENCOUNTER — ANTICOAGULATION VISIT (OUTPATIENT)
Dept: VASCULAR LAB | Facility: MEDICAL CENTER | Age: 68
End: 2023-08-24
Attending: INTERNAL MEDICINE
Payer: MEDICARE

## 2023-08-24 VITALS — SYSTOLIC BLOOD PRESSURE: 110 MMHG | DIASTOLIC BLOOD PRESSURE: 64 MMHG | HEART RATE: 58 BPM

## 2023-08-24 DIAGNOSIS — I82.452 ACUTE DEEP VEIN THROMBOSIS (DVT) OF LEFT PERONEAL VEIN (HCC): ICD-10-CM

## 2023-08-24 DIAGNOSIS — D68.51 FACTOR V LEIDEN (HCC): ICD-10-CM

## 2023-08-24 DIAGNOSIS — Z86.718 HX OF BLOOD CLOTS: ICD-10-CM

## 2023-08-24 DIAGNOSIS — Z86.711 HISTORY OF PULMONARY EMBOLISM: ICD-10-CM

## 2023-08-24 PROCEDURE — 99212 OFFICE O/P EST SF 10 MIN: CPT | Performed by: NURSE PRACTITIONER

## 2023-08-24 PROCEDURE — 3078F DIAST BP <80 MM HG: CPT

## 2023-08-24 PROCEDURE — 3074F SYST BP LT 130 MM HG: CPT

## 2023-08-24 ASSESSMENT — ENCOUNTER SYMPTOMS
FALLS: 0
SEIZURES: 0
LOSS OF CONSCIOUSNESS: 0
BLOOD IN STOOL: 0
SHORTNESS OF BREATH: 0
BRUISES/BLEEDS EASILY: 1
CLAUDICATION: 0

## 2023-09-08 ENCOUNTER — TELEPHONE (OUTPATIENT)
Dept: HEALTH INFORMATION MANAGEMENT | Facility: OTHER | Age: 68
End: 2023-09-08
Payer: MEDICARE

## 2023-09-27 NOTE — PROGRESS NOTES
10/04/23    Subjective    Chief Complaint:  History of DVT and PE    HPI:  68 male with history of DVT and PE in the past (s/p shoulder surgery) and history of recurrent DVT in May of this year. He is referred for consultation by ARTIE Lopez from vascular clinic because of a heterozygous Factor V Leiden and a FH of hemochromatosis and MTHFR mutation.     ROS:    Constitutional: No weight loss  Skin: No rash or jaundice  HENT: No change in eyesight or hearing  Cardiovascular:No chest pain or arrythmia  Respiratory:No cough or SOB  GI:No nausea, vomiting, diarrhea, constipation  :No dysuria or frequency  Musculoskeletal:No bone or joint pain  Neuro:No sx's of neuropathy  Psych: No complaints    PMH:      Allergies   Allergen Reactions    Pcn [Penicillins] Unspecified     Per pt  As an infant  Was told  Per mother       Past Medical History:   Diagnosis Date    Cancer (HCC)     skin    DVT (deep venous thrombosis) (HCC) 2014    lle    PE (pulmonary embolism)     Psychiatric disorder     anxiety         Past Surgical History:   Procedure Laterality Date    OTHER ORTHOPEDIC SURGERY      laprascopic shoulder    TONSILLECTOMY          Medications:    Current Outpatient Medications on File Prior to Visit   Medication Sig Dispense Refill    rivaroxaban (XARELTO) 20 MG Tab tablet Take 1 Tablet by mouth with dinner. 90 Tablet 3     No current facility-administered medications on file prior to visit.       Social History     Tobacco Use    Smoking status: Never    Smokeless tobacco: Not on file   Substance Use Topics    Alcohol use: Yes     Comment: occ        No family history on file.     Objective    Vitals:    There were no vitals taken for this visit.    Physical Exam:    Appears well-developed and well-nourished. No distress.    Head -  Normocephalic .   Eyes - Pupils are equal. Conjunctivae normal. No scleral icterus.   Ears - normal hearing  Mouth - Throat: Oropharynx is clear and moist. No oropharyngeal  exudate  Neck - Neck supple. No thyromegaly  Cardiovascular - Normal rate, regular rhythm, normal heart sounds and intact distal pulses. No  gallop, murmur or rub  Pulmonary - Normal breath sounds.  No wheeze, rales or rhonchi  Breast - symmetrical. No mass on indentation.  Abdominal -Soft. No distension, tenderness, organomegaly or mass  Extremities-  No edema or tenderness.    Nodes - No submental, submandibular, preauricular, cervical, axillary or inguinal adenopathy.    Neurological -   Alert and oriented.  Skin - Skin is warm and dry. No rash noted. Not diaphoretic. No erythema. No pallor. No jaundice   Psychiatric -  Normal mood and affect.    Labs:     Latest Reference Range & Units 05/29/23 13:27   V Leiden Factor  Heterozygous !   Protein C Functional 83 - 168 % 154   Protein S-Functional 66 - 143 % 140      Latest Reference Range & Units 05/29/23 13:27   Anti-Cardiolipin Ab Iga <=11 APL <10   Anti-Cardiolipin Ab Igm <=12 MPL 12   Anti-Cariolipin Ab Igg <=14 GPL <10       Assessment    Imp:    Visit Diagnosis:    1. History of pulmonary embolism        2. History of DVT (deep vein thrombosis)        3. Family history of hemochromatosis              Plan:      Vince Myers M.D.

## 2023-10-04 ENCOUNTER — APPOINTMENT (OUTPATIENT)
Dept: HEMATOLOGY ONCOLOGY | Facility: MEDICAL CENTER | Age: 68
End: 2023-10-04
Payer: MEDICARE

## 2023-11-15 ENCOUNTER — TELEPHONE (OUTPATIENT)
Dept: PHARMACY | Facility: MEDICAL CENTER | Age: 68
End: 2023-11-15
Payer: MEDICARE

## 2023-11-15 PROCEDURE — RXMED WILLOW AMBULATORY MEDICATION CHARGE: Performed by: NURSE PRACTITIONER

## 2023-11-15 NOTE — TELEPHONE ENCOUNTER
Contact: [phone]   Maurice Melchorb         Phone number: [phone]    Name of person spoken with and relationship to patient: Maurice, leroy   Patient’s Adherence:            How patient is doing on medication:  well    How many missed doses and reason: 0    Any new medications: no    Any new conditions: no    Any new allergies: no    Any new side effects: no     Any new diagnoses: no     How many doses remaining:  approx 7    Did patient want to speak with pharmacist: no  Delivery:            Delivery date and method:  ship 11/16 USPS (informed approx. 3-5 business days)     Needs by Date:  11/21    Signature required:  no    Any additional details for :  norma Davis Appointment Date:  na  Shipping Address: [address] [place] [address]   Medication(name, strength and dose):  Xarelto (rivaroxaban) 20 MG Tabs qd with pm meal  Copay: $47  Payment Method: ccof  Supplies:  na  Additional info:  HERBIE Richards. He stated doing well on the medication. In the donut hole so doing #30/30ds for now.  No further questions/concerns at this time

## 2023-11-16 ENCOUNTER — PHARMACY VISIT (OUTPATIENT)
Dept: PHARMACY | Facility: MEDICAL CENTER | Age: 68
End: 2023-11-16
Payer: COMMERCIAL

## 2023-12-12 ENCOUNTER — TELEPHONE (OUTPATIENT)
Dept: PHARMACY | Facility: MEDICAL CENTER | Age: 68
End: 2023-12-12
Payer: MEDICARE

## 2023-12-12 PROCEDURE — RXMED WILLOW AMBULATORY MEDICATION CHARGE: Performed by: NURSE PRACTITIONER

## 2023-12-12 NOTE — TELEPHONE ENCOUNTER
Contact:  [id number]   Maurice Comer         Phone number: [phone]    Name of person spoken with and relationship to patient: Maurice, self   Patient’s Adherence:            How patient is doing on medication:  well    How many missed doses and reason: 0    Any new medications: no    Any new conditions: no    Any new allergies: no    Any new side effects: no     Any new diagnoses: no     How many doses remainin    Did patient want to speak with pharmacist: no  Delivery:            Delivery date and method:  ship  USPS (informed approx. 3-5 business days)     Needs by Date:      Signature required:  no    Any additional details for :  norma  Teach Appointment Date:  na  Shipping Address:  new address updated: [facility] [address]  Medication(name, strength and dose):   rivaroxaban 20 MG Tabs qd with pm meal  Copay: $68.42  Payment Method: ccof  Supplies:  na  Additional info:  HERBIE Richards. He stated doing well on the medication. Patient will be switching to new ins starting in January so that he can use any Dr or pharmacy both in [place] and [place] of [place]. He will be traveling a lot. He had some questions I was not able to answer and reached out to his Liaison Joann who will call him back. I had forgotten to ask about copay on ccof. called patient back and got vm . He was with Joann on a call already so I had her ask him while on phone. He approved the charge for $68.42 on ccof. No further questions/concerns at this time

## 2023-12-13 ENCOUNTER — PHARMACY VISIT (OUTPATIENT)
Dept: PHARMACY | Facility: MEDICAL CENTER | Age: 68
End: 2023-12-13
Payer: COMMERCIAL

## 2024-01-31 ENCOUNTER — TELEPHONE (OUTPATIENT)
Dept: VASCULAR LAB | Facility: MEDICAL CENTER | Age: 69
End: 2024-01-31

## 2024-01-31 ENCOUNTER — DOCUMENTATION (OUTPATIENT)
Dept: VASCULAR LAB | Facility: MEDICAL CENTER | Age: 69
End: 2024-01-31

## 2024-01-31 DIAGNOSIS — Z86.711 HISTORY OF PULMONARY EMBOLISM: ICD-10-CM

## 2024-01-31 DIAGNOSIS — I82.452 ACUTE DEEP VEIN THROMBOSIS (DVT) OF LEFT PERONEAL VEIN (HCC): ICD-10-CM

## 2024-01-31 NOTE — TELEPHONE ENCOUNTER
Received request via: Pharmacy    Was the patient seen in the last year in this department? Yes, anti-coag services    Does the patient have an active prescription (recently filled or refills available) for medication(s) requested? No    Pharmacy Name: [facility]    Does the patient have long term Plus and need 100 day supply (blood pressure, diabetes and cholesterol meds only)? Patient does not have SCP

## 2024-01-31 NOTE — PROGRESS NOTES
Received refill request for Xarelto. Called pt and spoke with him by phone. He is taking Xarelto as instructed. Currently traveling in [place]. Per our last visit, he opted for a 1 year follow up. Let him know I will approve refills for now but he will need to follow up in July/August for further refills. He verbalizes understanding. Offered to schedule now but he does not want to schedule a date and time yet as he's not sure exactly when he'll be back in [place]. Will await his follow up appt.    Elli TORRES

## 2024-02-01 NOTE — TELEPHONE ENCOUNTER
Received Refill PA request via [facility]  for XARELTO 20MG TABLETS. (Quantity:90, Day Supply:90)     Insurance: [facility] [employer] D  Member ID:  [id number]  BIN: [id number]  PCN: [id number]  Group: [id number]     Ran Test claim via [place] & medication Pays for a $503.17/90DS ; $413.17/30DS copay. Will outreach to patient to offer specialty pharmacy services and or release to preferred pharmacy    ALEXIA Olivia, PhT  Vascular Pharmacy Liaison (Rx Coordinator)  P: [phone]  1/31/2024 4:36 PM

## 2024-02-01 NOTE — TELEPHONE ENCOUNTER
Called and spoke to Pt regarding with questions that he have for Xarelto. Per pt, he states that [employer] would be able to assist him in order to lower his copay cost for Xarelto.     Pt is aware that his estimate copay will be over $500 due to the high deductible that he would need to meet, and then next couple fills, his copay should go lower.

## 2024-02-05 PROCEDURE — RXMED WILLOW AMBULATORY MEDICATION CHARGE: Performed by: NURSE PRACTITIONER

## 2024-02-05 NOTE — TELEPHONE ENCOUNTER
Called and spoke to Pt to discuss his copay options for his Xarelto medication. Per Pt, he states that he is getting still a high copay through [facility] pharmacy.     Pt was offered special pricing through [employer] and Pt verbally accepted the offer. Pt also states that he's in the middle of planning to move to another state for good. Pt is aware that the special pricing that was awarded to him will be only good here at [facility] pharmacy. Pt verbalized understanding.     Notified and updated provider for the status.     ALEXIA Olivia, PhT  Vascular Pharmacy Liaison (Rx Coordinator)  P: [phone]  2/5/2024 1:13 PM

## 2024-02-05 NOTE — TELEPHONE ENCOUNTER
Received new script for Xarelto to be dispensed to [facility] pharmacy.     New copay: $150.81/90DS ; $50.27/30DS billed under 340b pricing per MD's approval.     ALEXIA Olivia, PhT  Vascular Pharmacy Liaison (Rx Coordinator)  P: [phone]  2/5/2024 1:20 PM

## 2024-02-05 NOTE — TELEPHONE ENCOUNTER
Contact:  Phone number:[phone] (mobile)    Name of person spoken with and relationship to patient: SELF   Patient’s Adherence:  How patient is doing on medication: Very Well    How many missed doses and reason: 0 N/A    Any new medications: no    Any new conditions: no    Any new allergies: no    Any new side effects: no    Any new diagnoses: no    How many doses remainin    Did patient want to speak with pharmacist: No   Delivery:  Delivery date and method: 2024 via [employer] GROUND 1-2 DAYS     Needs by Date: 2024    Signature required: No     Any additional details for :  leave at front door (where the stairs are located)    Teach Appointment Date:  N/A   Shipping Address:  [address] [place] [address]   [place]  [place] [address]   Medication(name,strength and dose):  XARELTO 20MG TABLETS   Copay:  $150.81 (340B PRICING)   Payment Method:  Credit card on file   Supplies:  N/A   Additional Information:  Called and spoke to Pt regarding with the follow up. Pt would like to go ahead and fill his xarelto prescription to [facility] pharmacy. Pt reports that he is still getting a high copay if he decided to fill his script to [facility] pharmacy. Pt coordinated his refills today, and will be expecting him to receive his medication on  or  the latest. Pt has no questions or concerns at this time. Pt also reports that he is in the middle of moving to another state. Pt is aware that the pricing that we provided for him [facility] only be [facility]. Updated refill call date for pt for 2024.      ALEXIA Olivia, PhT  Vascular Pharmacy Liaison (Rx Coordinator)  P: [phone]  2024 1:35 PM

## 2024-02-08 ENCOUNTER — PHARMACY VISIT (OUTPATIENT)
Dept: PHARMACY | Facility: MEDICAL CENTER | Age: 69
End: 2024-02-08
Payer: COMMERCIAL

## 2024-05-28 DIAGNOSIS — I82.452 ACUTE DEEP VEIN THROMBOSIS (DVT) OF LEFT PERONEAL VEIN (HCC): ICD-10-CM

## 2024-05-31 ENCOUNTER — TELEPHONE (OUTPATIENT)
Dept: HEALTH INFORMATION MANAGEMENT | Facility: OTHER | Age: 69
End: 2024-05-31

## 2025-08-25 ENCOUNTER — TELEPHONE (OUTPATIENT)
Dept: VASCULAR LAB | Facility: MEDICAL CENTER | Age: 70
End: 2025-08-25
Payer: COMMERCIAL

## 2025-08-25 DIAGNOSIS — I82.452 ACUTE DEEP VEIN THROMBOSIS (DVT) OF LEFT PERONEAL VEIN (HCC): Primary | ICD-10-CM

## 2025-08-25 DIAGNOSIS — Z86.711 HISTORY OF PULMONARY EMBOLISM: ICD-10-CM

## 2025-08-26 ENCOUNTER — HOSPITAL ENCOUNTER (OUTPATIENT)
Dept: LAB | Facility: MEDICAL CENTER | Age: 70
End: 2025-08-26
Attending: NURSE PRACTITIONER
Payer: MEDICARE

## 2025-08-26 ENCOUNTER — ANTICOAGULATION VISIT (OUTPATIENT)
Dept: VASCULAR LAB | Facility: MEDICAL CENTER | Age: 70
End: 2025-08-26
Attending: INTERNAL MEDICINE
Payer: MEDICARE

## 2025-08-26 VITALS
SYSTOLIC BLOOD PRESSURE: 136 MMHG | WEIGHT: 212 LBS | DIASTOLIC BLOOD PRESSURE: 66 MMHG | BODY MASS INDEX: 28.1 KG/M2 | HEIGHT: 73 IN | HEART RATE: 87 BPM

## 2025-08-26 DIAGNOSIS — I82.452 ACUTE DEEP VEIN THROMBOSIS (DVT) OF LEFT PERONEAL VEIN (HCC): Primary | ICD-10-CM

## 2025-08-26 DIAGNOSIS — Z86.711 HISTORY OF PULMONARY EMBOLISM: ICD-10-CM

## 2025-08-26 DIAGNOSIS — D68.51 FACTOR V LEIDEN (HCC): ICD-10-CM

## 2025-08-26 DIAGNOSIS — Z86.718 HX OF BLOOD CLOTS: ICD-10-CM

## 2025-08-26 LAB
ALBUMIN SERPL BCP-MCNC: 4.1 G/DL (ref 3.2–4.9)
ALBUMIN/GLOB SERPL: 1.6 G/DL
ALP SERPL-CCNC: 54 U/L (ref 30–99)
ALT SERPL-CCNC: 32 U/L (ref 2–50)
ANION GAP SERPL CALC-SCNC: 10 MMOL/L (ref 7–16)
AST SERPL-CCNC: 23 U/L (ref 12–45)
BILIRUB SERPL-MCNC: 0.5 MG/DL (ref 0.1–1.5)
BUN SERPL-MCNC: 10 MG/DL (ref 8–22)
CALCIUM ALBUM COR SERPL-MCNC: 8.9 MG/DL (ref 8.5–10.5)
CALCIUM SERPL-MCNC: 9 MG/DL (ref 8.5–10.5)
CHLORIDE SERPL-SCNC: 107 MMOL/L (ref 96–112)
CO2 SERPL-SCNC: 24 MMOL/L (ref 20–33)
CREAT SERPL-MCNC: 1.05 MG/DL (ref 0.5–1.4)
ERYTHROCYTE [DISTWIDTH] IN BLOOD BY AUTOMATED COUNT: 45 FL (ref 35.9–50)
GFR SERPLBLD CREATININE-BSD FMLA CKD-EPI: 76 ML/MIN/1.73 M 2
GLOBULIN SER CALC-MCNC: 2.6 G/DL (ref 1.9–3.5)
GLUCOSE SERPL-MCNC: 97 MG/DL (ref 65–99)
HCT VFR BLD AUTO: 44.2 % (ref 42–52)
HGB BLD-MCNC: 14.8 G/DL (ref 14–18)
MCH RBC QN AUTO: 31.2 PG (ref 27–33)
MCHC RBC AUTO-ENTMCNC: 33.5 G/DL (ref 32.3–36.5)
MCV RBC AUTO: 93.2 FL (ref 81.4–97.8)
PLATELET # BLD AUTO: 273 K/UL (ref 164–446)
PMV BLD AUTO: 9.4 FL (ref 9–12.9)
POTASSIUM SERPL-SCNC: 4.1 MMOL/L (ref 3.6–5.5)
PROT SERPL-MCNC: 6.7 G/DL (ref 6–8.2)
RBC # BLD AUTO: 4.74 M/UL (ref 4.7–6.1)
SODIUM SERPL-SCNC: 141 MMOL/L (ref 135–145)
WBC # BLD AUTO: 5.8 K/UL (ref 4.8–10.8)

## 2025-08-26 PROCEDURE — 85027 COMPLETE CBC AUTOMATED: CPT

## 2025-08-26 PROCEDURE — 36415 COLL VENOUS BLD VENIPUNCTURE: CPT

## 2025-08-26 PROCEDURE — 99213 OFFICE O/P EST LOW 20 MIN: CPT

## 2025-08-26 PROCEDURE — 80053 COMPREHEN METABOLIC PANEL: CPT

## 2025-08-27 ENCOUNTER — DOCUMENTATION (OUTPATIENT)
Dept: VASCULAR LAB | Facility: MEDICAL CENTER | Age: 70
End: 2025-08-27
Payer: COMMERCIAL

## 2025-08-27 ENCOUNTER — TELEPHONE (OUTPATIENT)
Dept: VASCULAR LAB | Facility: MEDICAL CENTER | Age: 70
End: 2025-08-27
Payer: COMMERCIAL